# Patient Record
Sex: MALE | Race: BLACK OR AFRICAN AMERICAN | URBAN - METROPOLITAN AREA
[De-identification: names, ages, dates, MRNs, and addresses within clinical notes are randomized per-mention and may not be internally consistent; named-entity substitution may affect disease eponyms.]

---

## 2024-02-05 ENCOUNTER — OFFICE VISIT (OUTPATIENT)
Age: 44
End: 2024-02-05

## 2024-02-05 VITALS
BODY MASS INDEX: 22.96 KG/M2 | DIASTOLIC BLOOD PRESSURE: 78 MMHG | WEIGHT: 155 LBS | HEART RATE: 95 BPM | SYSTOLIC BLOOD PRESSURE: 129 MMHG | OXYGEN SATURATION: 99 % | HEIGHT: 69 IN | TEMPERATURE: 99.9 F

## 2024-02-05 DIAGNOSIS — M54.31 SCIATICA OF RIGHT SIDE: Primary | ICD-10-CM

## 2024-02-05 PROCEDURE — 99202 OFFICE O/P NEW SF 15 MIN: CPT | Performed by: FAMILY MEDICINE

## 2024-02-05 NOTE — PROGRESS NOTES
HPI  Paras Altamirano is a 43 y.o. male being seen today for   Chief Complaint   Patient presents with    Annual Exam   .  he states that for about 3 weeks has right back pain with right foot numbness.  He has had sciatica before but it was on the left.  For that episode he underwent PT which was helpful.  No specific injury preceded this episode.  It has gotten much better since the onset just by resting.  He is working again but trying to be really careful.  He is not back to 100%  stil has some pain with activity. He is using otc meds with some relief.     No bladder or bowel changes.   Did PT years ago and does his exercises occasionally.     Rarely smokes a black and mild    Fam hx: htn  Gm: cancer of unclear type  Aunt lung cancer        Past Medical History:   Diagnosis Date    Asthma          ROS  Patient states that he is feeling well. Denies complaints of chest pain, shortness of breath, swelling of legs, dizziness or weakness. he denies nausea, vomiting or diarrhea.        No current outpatient medications on file.     No current facility-administered medications for this visit.       PE  /78 (Site: Left Upper Arm, Position: Sitting, Cuff Size: Medium Adult)   Pulse 95   Temp 99.9 °F (37.7 °C) (Temporal)   Ht 1.753 m (5' 9\")   Wt 70.3 kg (155 lb)   SpO2 99%   BMI 22.89 kg/m²      Alert and oriented with normal mood and affect. he is well developed and well nourished . Lungs are clear without wheezing. Heart rate is regular without murmurs or gallops. There is no lower extremity edema.  Some tenderness over low back but no bony tenderness.  Gait is stable but stiff.     No results found for this visit on 02/05/24.      Assessment and Plan:       Diagnosis Orders   1. Sciatica of right side          Home exercises for sciatica  Continue otc meds  Advised he should do his exercises regularly even after he is back to normal.  If he does not get to 100% of his previous function, call us for a PT

## 2024-02-05 NOTE — PROGRESS NOTES
Discharge instructions reviewed with patient    Medication list and understanding of medications reviewed with patient.   OTC and herbal medications reviewed and added to med list if applicable  Barriers to adherence assessed.    Guidance given regarding new medications this visit, including reason for taking this medicine, and common side effects.     AVS given to patient. Explained to patient. Patient expressed understanding.